# Patient Record
Sex: FEMALE | Race: WHITE | NOT HISPANIC OR LATINO | Employment: OTHER | URBAN - NONMETROPOLITAN AREA
[De-identification: names, ages, dates, MRNs, and addresses within clinical notes are randomized per-mention and may not be internally consistent; named-entity substitution may affect disease eponyms.]

---

## 2017-01-20 PROBLEM — I10 HTN (HYPERTENSION): Status: ACTIVE | Noted: 2017-01-20

## 2017-01-20 PROBLEM — E78.5 HLD (HYPERLIPIDEMIA): Status: ACTIVE | Noted: 2017-01-20

## 2017-01-23 ENCOUNTER — CLINICAL SUPPORT (OUTPATIENT)
Dept: CARDIOLOGY | Facility: CLINIC | Age: 62
End: 2017-01-23

## 2017-01-23 ENCOUNTER — OFFICE VISIT (OUTPATIENT)
Dept: CARDIOLOGY | Facility: CLINIC | Age: 62
End: 2017-01-23

## 2017-01-23 VITALS
SYSTOLIC BLOOD PRESSURE: 132 MMHG | DIASTOLIC BLOOD PRESSURE: 78 MMHG | HEART RATE: 59 BPM | HEIGHT: 64 IN | BODY MASS INDEX: 26.98 KG/M2 | WEIGHT: 158 LBS

## 2017-01-23 DIAGNOSIS — Z95.818 STATUS POST PLACEMENT OF IMPLANTABLE LOOP RECORDER: Primary | ICD-10-CM

## 2017-01-23 DIAGNOSIS — R55 SYNCOPE, UNSPECIFIED SYNCOPE TYPE: ICD-10-CM

## 2017-01-23 DIAGNOSIS — I10 ESSENTIAL HYPERTENSION: ICD-10-CM

## 2017-01-23 DIAGNOSIS — E78.2 MIXED HYPERLIPIDEMIA: ICD-10-CM

## 2017-01-23 DIAGNOSIS — R07.9 CHEST PAIN IN ADULT: Primary | ICD-10-CM

## 2017-01-23 DIAGNOSIS — R42 POSTURAL DIZZINESS WITH PRESYNCOPE: ICD-10-CM

## 2017-01-23 DIAGNOSIS — R55 POSTURAL DIZZINESS WITH PRESYNCOPE: ICD-10-CM

## 2017-01-23 DIAGNOSIS — I25.118 CORONARY ARTERY DISEASE INVOLVING NATIVE CORONARY ARTERY OF NATIVE HEART WITH OTHER FORM OF ANGINA PECTORIS (HCC): ICD-10-CM

## 2017-01-23 PROCEDURE — 99024 POSTOP FOLLOW-UP VISIT: CPT | Performed by: INTERNAL MEDICINE

## 2017-01-23 PROCEDURE — 93291 INTERROG DEV EVAL SCRMS IP: CPT | Performed by: INTERNAL MEDICINE

## 2017-01-23 PROCEDURE — 93000 ELECTROCARDIOGRAM COMPLETE: CPT | Performed by: INTERNAL MEDICINE

## 2017-01-23 RX ORDER — AMLODIPINE BESYLATE 5 MG/1
TABLET ORAL
COMMUNITY
Start: 2017-01-17

## 2017-01-23 NOTE — LETTER
2017     Thang Miller MD  [address] [facility] [address]    Patient: Genoveva Crooks   YOB: 1955   Date of Visit: 2017       Dear Dr. Paul MD:    Thank you for referring Genoveva Crooks to me for evaluation. Below are the relevant portions of my assessment and plan of care.    If you have questions, please do not hesitate to call me. I look forward to following Genoveva along with you.         Sincerely,        Evangelista Naranjo MD        CC: No Recipients  Evangelista Naranjo MD  2017 10:55 AM  Signed  Genoveva Crooks  [id number]  1955  61 y.o.  female    Referring Provider: Thang Miller MD    Reason for Follow-up Visit: CAD      Overall doing well  Denies any chest pain or excessive shortness of breath  Compliant with medications    History of present illness:  Genoveva Crooks is a 61 y.o. yo female with history of CAD who presents today for   Chief Complaint   Patient presents with   • Chest Pain     1 MON FU D/C   • Rapid Heart Rate     ONCE SINCE D/C    .    History  Past Medical History   Diagnosis Date   • Arthritis    • Asthma    • Atherosclerosis of native coronary artery of native heart without angina pectoris    • COPD (chronic obstructive pulmonary disease)    • Depression    • Diabetes mellitus    • GERD (gastroesophageal reflux disease)    • Hospital discharge follow-up    • Hyperlipidemia    • Hypertension    • Paroxysmal SVT (supraventricular tachycardia)    • Pericardial effusion    • Pleural effusion    • Restless leg syndrome    • Stenosis of carotid artery      Unspecified laterally   • Tobacco abuse    ,   Past Surgical History   Procedure Laterality Date   • Cardiac surgery     • Pericardiocentesis     •  section     • Joint replacement     • Cholecystectomy     • Cholecystectomy  10 yrs ago   • Cardiac catheterization     • Peripheral arterial stent graft     • Cardiac electrophysiology procedure N/A 2016     Procedure: Loop insertion;  Surgeon:  Evangelista Naranjo MD;  Location:  [facility] CATH INVASIVE LOC[facility];  Service:    • Ankle surgery       Right   • Replacement total knee       Left   ,   Family History   Problem Relation Age of Onset   • Cancer Mother    • Heart disease Mother    • Hypertension Mother    • Heart disease Father    • Hypertension Father    • Heart attack Sister      Age 57   ,   Social History   Substance Use Topics   • Smoking status: Current Every Day Smoker     Packs/day: 0.50     Types: Cigarettes   • Smokeless tobacco: None   • Alcohol use No   ,     Medications  Current Outpatient Prescriptions   Medication Sig Dispense Refill   • amLODIPine (NORVASC) 5 MG tablet      • clopidogrel (PLAVIX) 75 MG tablet Take 75 mg by mouth Daily.     • Dapagliflozin Propanediol (FARXIGA) 10 MG tablet Take 1 tablet by mouth Daily.     • gabapentin (NEURONTIN) 100 MG capsule Take 100 mg by mouth 3 (Three) Times a Day.     • metFORMIN (GLUCOPHAGE) 1000 MG tablet Take 1,000 mg by mouth 2 (Two) Times a Day With Meals.     • metoprolol succinate XL (TOPROL-XL) 100 MG 24 hr tablet Take 100 mg by mouth Daily.     • simvastatin (ZOCOR) 40 MG tablet Take 20 mg by mouth Every Night.     • venlafaxine (EFFEXOR) 75 MG tablet Take 50 mg by mouth Daily.       No current facility-administered medications for this visit.        Allergies:  Aspirin and Celecoxib    Review of Systems  Review of Systems   Constitution: Negative.   HENT: Negative.    Eyes: Negative.    Cardiovascular: Positive for dyspnea on exertion. Negative for chest pain, claudication, cyanosis, irregular heartbeat, leg swelling, near-syncope, orthopnea, palpitations, paroxysmal nocturnal dyspnea and syncope.   Respiratory: Negative.    Endocrine: Negative.    Hematologic/Lymphatic: Negative.    Skin: Negative.    Gastrointestinal: Negative for anorexia.   Genitourinary: Negative.    Neurological: Negative.    Psychiatric/Behavioral: Negative.        Objective     Physical Exam:  Visit Vitals   • BP  "132/78   • Pulse 59   • Ht 64\" (162.6 cm)   • Wt 158 lb (71.7 kg)   • BMI 27.12 kg/m2     Physical Exam   Constitutional: She appears well-developed.   HENT:   Head: Normocephalic.   Neck: Normal carotid pulses and no JVD present. No tracheal tenderness present. Carotid bruit is not present. No tracheal deviation and no edema present.   Cardiovascular: Regular rhythm, normal heart sounds and normal pulses.    Pulmonary/Chest: Effort normal. No stridor.   Abdominal: Soft.   Neurological: She is alert. She has normal strength. No cranial nerve deficit or sensory deficit.   Skin: Skin is warm.   Psychiatric: She has a normal mood and affect. Her speech is normal and behavior is normal.       Results Review:       ECG 12 Lead  Date/Time: 1/23/2017 10:49 AM  Performed by: LUIS KENDRICK  Authorized by: LUIS KENDRICK   Comparison: compared with previous ECG from 12/16/2016  Similar to previous ECG  Rhythm: sinus bradycardia  Rate: bradycardic  QRS axis: normal  Clinical impression: normal ECG            Assessment/Plan   Patient Active Problem List   Diagnosis   • CAD (coronary artery disease)   • HTN (hypertension)   • HLD (hyperlipidemia)   • Postural dizziness with presyncope       No palpitations. No significant pedal edema. Compliant with medications and diet. Latest labs and medications reviewed.  Recent stress echo low risk  Echo normal LVEF  Tilt orthostasis. No neurocardiogenic syncope    Plan:  Check LINQ  Close follow up with you as scheduled.  Intensive factor modifications.  See order list.    Counseled regarding disease appropriate diet, fluid, caffeine, stimulants and sodium intake as well as importance of compliance to diet, exercise and regular follow up.    Return in about 6 months (around 7/23/2017).                "

## 2017-01-23 NOTE — PROGRESS NOTES
Genoveva Crooks  [id number]  1955  61 y.o.  female    Referring Provider: Thang Miller MD    Reason for Follow-up Visit: CAD      Overall doing well  Denies any chest pain or excessive shortness of breath  Compliant with medications    History of present illness:  Genoveva Crooks is a 61 y.o. yo female with history of CAD who presents today for   Chief Complaint   Patient presents with   • Chest Pain     1 MON FU D/C   • Rapid Heart Rate     ONCE SINCE D/C    .    History  Past Medical History   Diagnosis Date   • Arthritis    • Asthma    • Atherosclerosis of native coronary artery of native heart without angina pectoris    • COPD (chronic obstructive pulmonary disease)    • Depression    • Diabetes mellitus    • GERD (gastroesophageal reflux disease)    • Hospital discharge follow-up    • Hyperlipidemia    • Hypertension    • Paroxysmal SVT (supraventricular tachycardia)    • Pericardial effusion    • Pleural effusion    • Restless leg syndrome    • Stenosis of carotid artery      Unspecified laterally   • Tobacco abuse    ,   Past Surgical History   Procedure Laterality Date   • Cardiac surgery     • Pericardiocentesis     •  section     • Joint replacement     • Cholecystectomy     • Cholecystectomy  10 yrs ago   • Cardiac catheterization     • Peripheral arterial stent graft     • Cardiac electrophysiology procedure N/A 2016     Procedure: Loop insertion;  Surgeon: Evangelista Naranjo MD;  Location: [facility] CATH INVASIVE LOCATION;  Service:    • Ankle surgery       Right   • Replacement total knee       Left   ,   Family History   Problem Relation Age of Onset   • Cancer Mother    • Heart disease Mother    • Hypertension Mother    • Heart disease Father    • Hypertension Father    • Heart attack Sister      Age 57   ,   Social History   Substance Use Topics   • Smoking status: Current Every Day Smoker     Packs/day: 0.50     Types: Cigarettes   • Smokeless tobacco: None   • Alcohol use No   ,  "    Medications  Current Outpatient Prescriptions   Medication Sig Dispense Refill   • amLODIPine (NORVASC) 5 MG tablet      • clopidogrel (PLAVIX) 75 MG tablet Take 75 mg by mouth Daily.     • Dapagliflozin Propanediol (FARXIGA) 10 MG tablet Take 1 tablet by mouth Daily.     • gabapentin (NEURONTIN) 100 MG capsule Take 100 mg by mouth 3 (Three) Times a Day.     • metFORMIN (GLUCOPHAGE) 1000 MG tablet Take 1,000 mg by mouth 2 (Two) Times a Day With Meals.     • metoprolol succinate XL (TOPROL-XL) 100 MG 24 hr tablet Take 100 mg by mouth Daily.     • simvastatin (ZOCOR) 40 MG tablet Take 20 mg by mouth Every Night.     • venlafaxine (EFFEXOR) 75 MG tablet Take 50 mg by mouth Daily.       No current facility-administered medications for this visit.        Allergies:  Aspirin and Celecoxib    Review of Systems  Review of Systems   Constitution: Negative.   HENT: Negative.    Eyes: Negative.    Cardiovascular: Positive for dyspnea on exertion. Negative for chest pain, claudication, cyanosis, irregular heartbeat, leg swelling, near-syncope, orthopnea, palpitations, paroxysmal nocturnal dyspnea and syncope.   Respiratory: Negative.    Endocrine: Negative.    Hematologic/Lymphatic: Negative.    Skin: Negative.    Gastrointestinal: Negative for anorexia.   Genitourinary: Negative.    Neurological: Negative.    Psychiatric/Behavioral: Negative.        Objective     Physical Exam:  Visit Vitals   • /78   • Pulse 59   • Ht 64\" (162.6 cm)   • Wt 158 lb (71.7 kg)   • BMI 27.12 kg/m2     Physical Exam   Constitutional: She appears well-developed.   HENT:   Head: Normocephalic.   Neck: Normal carotid pulses and no JVD present. No tracheal tenderness present. Carotid bruit is not present. No tracheal deviation and no edema present.   Cardiovascular: Regular rhythm, normal heart sounds and normal pulses.    Pulmonary/Chest: Effort normal. No stridor.   Abdominal: Soft.   Neurological: She is alert. She has normal strength. No " cranial nerve deficit or sensory deficit.   Skin: Skin is warm.   Psychiatric: She has a normal mood and affect. Her speech is normal and behavior is normal.       Results Review:       ECG 12 Lead  Date/Time: 1/23/2017 10:49 AM  Performed by: LUIS KENDRICK  Authorized by: LUIS KENDRICK   Comparison: compared with previous ECG from 12/16/2016  Similar to previous ECG  Rhythm: sinus bradycardia  Rate: bradycardic  QRS axis: normal  Clinical impression: normal ECG            Assessment/Plan   Patient Active Problem List   Diagnosis   • CAD (coronary artery disease)   • HTN (hypertension)   • HLD (hyperlipidemia)   • Postural dizziness with presyncope       No palpitations. No significant pedal edema. Compliant with medications and diet. Latest labs and medications reviewed.  Recent stress echo low risk  Echo normal LVEF  Tilt orthostasis. No neurocardiogenic syncope    Plan:  Check LINQ  Close follow up with you as scheduled.  Intensive factor modifications.  See order list.    Counseled regarding disease appropriate diet, fluid, caffeine, stimulants and sodium intake as well as importance of compliance to diet, exercise and regular follow up.    Return in about 6 months (around 7/23/2017).

## 2017-01-23 NOTE — MR AVS SNAPSHOT
Genoveva Crooks   1/23/2017 10:15 AM   Office Visit    Dept Phone:  [phone]   Encounter #:  [id number]    Provider:  Evangelista Naranjo MD   Department:  [facility]                Your Full Care Plan              Your Updated Medication List          This list is accurate as of: 1/23/17 11:03 AM.  Always use your most recent med list.                amLODIPine 5 MG tablet   Commonly known as:  NORVASC       clopidogrel 75 MG tablet   Commonly known as:  PLAVIX       FARXIGA 10 MG tablet   Generic drug:  Dapagliflozin Propanediol       gabapentin 100 MG capsule   Commonly known as:  NEURONTIN       metFORMIN 1000 MG tablet   Commonly known as:  GLUCOPHAGE       metoprolol succinate  MG 24 hr tablet   Commonly known as:  TOPROL-XL       simvastatin 40 MG tablet   Commonly known as:  ZOCOR       venlafaxine 75 MG tablet   Commonly known as:  EFFEXOR               We Performed the Following     ECG 12 Lead       You Were Diagnosed With        Codes Comments    Chest pain in adult    -  Primary ICD-10-CM: R07.9  ICD-9-CM: 786.50     Coronary artery disease involving native coronary artery of native heart with other form of angina pectoris     ICD-10-CM: I25.118     Essential hypertension     ICD-10-CM: I10  ICD-9-CM: 401.9     Mixed hyperlipidemia     ICD-10-CM: E78.2  ICD-9-CM: 272.2     Postural dizziness with presyncope     ICD-10-CM: R42, R55  ICD-9-CM: 780.4, 780.2       Instructions     None    Patient Instructions History      Upcoming Appointments     Visit Type Date Time Department    HOSPITAL FOLLOW UP 1/23/2017 10:15 AM [facility]    FOLLOW UP 7/27/2017  9:30 AM [facility]      MyChart Signup     Our records indicate that you have declined [facility] SecureAuthhart signup. If you would like to sign up for SecureAuthhart, please email [email or web address] or call [phone] to obtain an activation code.             Other Info from Your  "Visit           Your Appointments     Jul 27, 2017  9:30 AM CDT   Follow Up with Evangelista Naranjo MD   [facility] (--)    [address] [place] [address] [address]  [facility] [id number] [phone]           Arrive 15 minutes prior to appointment.              Allergies     Aspirin  Hives    Only if she takes for several days in a row    Celecoxib  Hives      Reason for Visit     Chest Pain 1 MON FU D/C    Rapid Heart Rate ONCE SINCE D/C       Vital Signs     Blood Pressure Pulse Height Weight Body Mass Index Smoking Status    132/78 59 64\" (162.6 cm) 158 lb (71.7 kg) 27.12 kg/m2 Current Every Day Smoker      Problems and Diagnoses Noted     Coronary heart disease    High cholesterol or triglycerides    High blood pressure    Postural dizziness with presyncope      No Longer an Issue     Chest pain in adult      Results     ECG 12 Lead               "

## 2017-02-03 NOTE — PROGRESS NOTES
I have reviewed the notes, assessments, and/or procedures performed by Key Mason I concur with her documentation of  Genoveva Crooks.

## 2017-05-08 ENCOUNTER — CLINICAL SUPPORT (OUTPATIENT)
Dept: CARDIOLOGY | Facility: CLINIC | Age: 62
End: 2017-05-08

## 2017-05-08 DIAGNOSIS — R55 SYNCOPE, UNSPECIFIED SYNCOPE TYPE: ICD-10-CM

## 2017-05-08 DIAGNOSIS — Z95.818 STATUS POST PLACEMENT OF IMPLANTABLE LOOP RECORDER: Primary | ICD-10-CM

## 2017-05-08 PROCEDURE — 93299 PR REM INTERROG ICPMS/SCRMS <30 D TECH REVIEW: CPT | Performed by: INTERNAL MEDICINE

## 2017-05-08 PROCEDURE — 93298 REM INTERROG DEV EVAL SCRMS: CPT | Performed by: INTERNAL MEDICINE

## 2017-08-04 ENCOUNTER — CLINICAL SUPPORT (OUTPATIENT)
Dept: CARDIOLOGY | Facility: CLINIC | Age: 62
End: 2017-08-04

## 2017-08-04 DIAGNOSIS — Z95.818 STATUS POST PLACEMENT OF IMPLANTABLE LOOP RECORDER: Primary | ICD-10-CM

## 2017-08-04 DIAGNOSIS — R55 SYNCOPE, UNSPECIFIED SYNCOPE TYPE: ICD-10-CM

## 2017-08-04 PROCEDURE — 93298 REM INTERROG DEV EVAL SCRMS: CPT | Performed by: INTERNAL MEDICINE

## 2017-08-04 PROCEDURE — 93299 PR REM INTERROG ICPMS/SCRMS <30 D TECH REVIEW: CPT | Performed by: INTERNAL MEDICINE

## 2017-08-06 NOTE — PROGRESS NOTES
Linq Report- [facility]    August 6, 2017    Primary Cardiologist:  Marcella  Reason for implant:  syncope  Battery:  OK  Events:  No new events  Changes:  n/a    Follow up:  3 months

## 2017-08-11 NOTE — PROGRESS NOTES
I have reviewed the notes, assessments, and/or procedures performed by Key Mason RN , I concur with her  documentation of  Genoveva Crooks.

## 2017-10-26 ENCOUNTER — TELEPHONE (OUTPATIENT)
Dept: CARDIOLOGY | Facility: CLINIC | Age: 62
End: 2017-10-26

## 2017-10-26 NOTE — TELEPHONE ENCOUNTER
WAS TRYING TO RETURN KAVON'S CALL FOR SURGERY CLEARANCE FOR 10/27/17     SPOKE WITH ONEIL, SHE COULD NOT FIND ANYONE THAT KNEW ANYTHING ABOUT THIS.  SHE STATED SHE WOULD HAVE THEM RETURN ARE CALL IF NEEDED.

## 2017-11-20 ENCOUNTER — CLINICAL SUPPORT (OUTPATIENT)
Dept: CARDIOLOGY | Facility: CLINIC | Age: 62
End: 2017-11-20

## 2017-11-20 DIAGNOSIS — Z95.818 STATUS POST PLACEMENT OF IMPLANTABLE LOOP RECORDER: Primary | ICD-10-CM

## 2017-11-20 DIAGNOSIS — R55 SYNCOPE, UNSPECIFIED SYNCOPE TYPE: ICD-10-CM

## 2017-11-20 PROCEDURE — 93298 REM INTERROG DEV EVAL SCRMS: CPT | Performed by: INTERNAL MEDICINE

## 2017-11-20 NOTE — PROGRESS NOTES
[employer] Monitoring Service Report    November 20, 2017    Primary Cardiologist:  Marcella  Reason for implant:  syncope  Battery:    Events:  There were no automatically detected episodes and there were no  symptomatic episodes documented by the patient.  Changes:  n/a    Follow up:  1 month

## 2017-11-26 NOTE — PROGRESS NOTES
I have reviewed the notes, assessments, and/or procedures performed by Suzan Scott , I concur with her  documentation of  Genoveva Crooks.

## 2017-12-20 ENCOUNTER — CLINICAL SUPPORT (OUTPATIENT)
Dept: CARDIOLOGY | Facility: CLINIC | Age: 62
End: 2017-12-20

## 2017-12-20 DIAGNOSIS — Z95.818 STATUS POST PLACEMENT OF IMPLANTABLE LOOP RECORDER: Primary | ICD-10-CM

## 2017-12-20 DIAGNOSIS — R55 SYNCOPE, UNSPECIFIED SYNCOPE TYPE: ICD-10-CM

## 2017-12-20 PROCEDURE — 93298 REM INTERROG DEV EVAL SCRMS: CPT | Performed by: INTERNAL MEDICINE

## 2017-12-20 NOTE — PROGRESS NOTES
[employer] Monitoring Service Report    December 20, 2017    Primary Cardiologist:  Marcella  Reason for implant:  syncope  Battery:    Events:  There were no automatically detected episodes and there were no  symptomatic episodes documented by the patient.  Changes:  n/a    Follow up:  1 month

## 2018-05-21 ENCOUNTER — CLINICAL SUPPORT (OUTPATIENT)
Dept: CARDIOLOGY | Facility: CLINIC | Age: 63
End: 2018-05-21

## 2018-05-21 DIAGNOSIS — Z95.818 STATUS POST PLACEMENT OF IMPLANTABLE LOOP RECORDER: Primary | ICD-10-CM

## 2018-05-21 DIAGNOSIS — R55 SYNCOPE, UNSPECIFIED SYNCOPE TYPE: ICD-10-CM

## 2018-05-21 PROCEDURE — 93299 PR REM INTERROG ICPMS/SCRMS <30 D TECH REVIEW: CPT | Performed by: INTERNAL MEDICINE

## 2018-05-21 PROCEDURE — 93298 REM INTERROG DEV EVAL SCRMS: CPT | Performed by: INTERNAL MEDICINE

## 2018-05-21 NOTE — PROGRESS NOTES
Linq Report- [facility]    May 21, 2018    Primary Cardiologist:  Marcella  Reason for implant:  syncope  Battery:  OK  Events:  No new events  Changes:  none    Follow up:  1 month

## 2018-07-13 ENCOUNTER — CLINICAL SUPPORT (OUTPATIENT)
Dept: CARDIOLOGY | Facility: CLINIC | Age: 63
End: 2018-07-13

## 2018-07-13 DIAGNOSIS — R55 SYNCOPE, UNSPECIFIED SYNCOPE TYPE: ICD-10-CM

## 2018-07-13 DIAGNOSIS — Z95.818 STATUS POST PLACEMENT OF IMPLANTABLE LOOP RECORDER: Primary | ICD-10-CM

## 2018-07-13 PROCEDURE — 93298 REM INTERROG DEV EVAL SCRMS: CPT | Performed by: INTERNAL MEDICINE

## 2018-07-13 PROCEDURE — 93299 PR REM INTERROG ICPMS/SCRMS <30 D TECH REVIEW: CPT | Performed by: INTERNAL MEDICINE

## 2018-08-03 NOTE — PROGRESS NOTES
I have reviewed the notes, assessments, and/or procedures performed by  Mylene Zaragoza RN, I concur with her  documentation of  Genoveva Crooks.

## 2018-08-14 ENCOUNTER — CLINICAL SUPPORT (OUTPATIENT)
Dept: CARDIOLOGY | Facility: CLINIC | Age: 63
End: 2018-08-14

## 2018-08-14 DIAGNOSIS — R55 SYNCOPE AND COLLAPSE: ICD-10-CM

## 2018-08-14 PROCEDURE — 93298 REM INTERROG DEV EVAL SCRMS: CPT | Performed by: PHYSICIAN ASSISTANT

## 2018-08-14 PROCEDURE — 93299 PR REM INTERROG ICPMS/SCRMS <30 D TECH REVIEW: CPT | Performed by: PHYSICIAN ASSISTANT

## 2020-10-26 NOTE — PROGRESS NOTES
Linq Report- [facility]    August 23, 2018    Primary Cardiologist:  Marcella  Reason for implant:  syncope  Battery:  ok  Events:  Sx noted but no events to correlate.  Changes:  n/a    Follow up:  3 months    
I have reviewed the notes, assessments, and/or procedures performed by  Jayna Nieto PA-C  , I concur with her  documentation of  Genoveva Crooks.    
PAST SURGICAL HISTORY:  History of tubal ligation     Pacemaker